# Patient Record
Sex: FEMALE | Race: WHITE | NOT HISPANIC OR LATINO | Employment: FULL TIME | URBAN - METROPOLITAN AREA
[De-identification: names, ages, dates, MRNs, and addresses within clinical notes are randomized per-mention and may not be internally consistent; named-entity substitution may affect disease eponyms.]

---

## 2024-07-18 ENCOUNTER — OFFICE VISIT (OUTPATIENT)
Dept: URGENT CARE | Facility: URGENT CARE | Age: 54
End: 2024-07-18
Payer: COMMERCIAL

## 2024-07-18 VITALS
DIASTOLIC BLOOD PRESSURE: 77 MMHG | TEMPERATURE: 99.6 F | WEIGHT: 275.6 LBS | RESPIRATION RATE: 28 BRPM | HEART RATE: 81 BPM | OXYGEN SATURATION: 100 % | SYSTOLIC BLOOD PRESSURE: 139 MMHG

## 2024-07-18 DIAGNOSIS — T14.8XXA WOUND INFECTION: Primary | ICD-10-CM

## 2024-07-18 DIAGNOSIS — L08.9 WOUND INFECTION: Primary | ICD-10-CM

## 2024-07-18 PROBLEM — F32.A ANXIETY AND DEPRESSION: Status: ACTIVE | Noted: 2023-06-27

## 2024-07-18 PROBLEM — Z82.69 FAMILY HISTORY OF ANKYLOSING SPONDYLITIS: Status: ACTIVE | Noted: 2024-01-31

## 2024-07-18 PROBLEM — R25.1 TREMOR: Status: ACTIVE | Noted: 2024-03-11

## 2024-07-18 PROBLEM — Z83.49 FAMILY HISTORY OF MTHFR DEFICIENCY: Status: ACTIVE | Noted: 2024-01-31

## 2024-07-18 PROBLEM — F41.9 ANXIETY AND DEPRESSION: Status: ACTIVE | Noted: 2023-06-27

## 2024-07-18 PROBLEM — S82.899A CLOSED FRACTURE OF ANKLE: Status: ACTIVE | Noted: 2024-07-18

## 2024-07-18 PROBLEM — Z63.0 PARTNER RELATIONSHIP PROBLEM: Status: ACTIVE | Noted: 2024-07-18

## 2024-07-18 PROBLEM — M54.50 LOW BACK PAIN: Status: ACTIVE | Noted: 2024-07-18

## 2024-07-18 PROBLEM — G47.33 OBSTRUCTIVE SLEEP APNEA OF ADULT: Status: ACTIVE | Noted: 2024-03-11

## 2024-07-18 PROBLEM — H93.13 TINNITUS, BILATERAL: Status: ACTIVE | Noted: 2024-07-18

## 2024-07-18 PROBLEM — M54.50 LOW BACK PAIN, UNSPECIFIED: Status: ACTIVE | Noted: 2024-07-18

## 2024-07-18 PROBLEM — E66.01 MORBID OBESITY (H): Status: ACTIVE | Noted: 2024-07-18

## 2024-07-18 PROBLEM — F32.9 MAJOR DEPRESSIVE DISORDER, SINGLE EPISODE, UNSPECIFIED: Status: ACTIVE | Noted: 2024-07-18

## 2024-07-18 PROBLEM — S02.2XXA CLOSED FRACTURE OF NASAL BONES: Status: ACTIVE | Noted: 2024-07-18

## 2024-07-18 PROBLEM — R21 SKIN RASH: Status: ACTIVE | Noted: 2024-07-18

## 2024-07-18 PROCEDURE — 99203 OFFICE O/P NEW LOW 30 MIN: CPT

## 2024-07-18 RX ORDER — SERTRALINE HYDROCHLORIDE 100 MG/1
100 TABLET, FILM COATED ORAL
COMMUNITY
Start: 2023-08-09

## 2024-07-18 RX ORDER — CEPHALEXIN 500 MG/1
500 CAPSULE ORAL 3 TIMES DAILY
Qty: 21 CAPSULE | Refills: 0 | Status: SHIPPED | OUTPATIENT
Start: 2024-07-18 | End: 2024-07-25

## 2024-07-18 RX ORDER — TOPIRAMATE 25 MG/1
75 TABLET, FILM COATED ORAL
COMMUNITY
Start: 2024-06-19

## 2024-07-18 RX ORDER — FUROSEMIDE 40 MG
40 TABLET ORAL 2 TIMES DAILY
COMMUNITY

## 2024-07-18 RX ORDER — PRIMIDONE 50 MG/1
150 TABLET ORAL
COMMUNITY
Start: 2024-01-15

## 2024-07-18 RX ORDER — TOPIRAMATE 100 MG/1
100 TABLET, FILM COATED ORAL
COMMUNITY
Start: 2023-03-07

## 2024-07-18 RX ORDER — ARIPIPRAZOLE 10 MG/1
10 TABLET ORAL
COMMUNITY
Start: 2023-03-07

## 2024-07-18 RX ORDER — ALBUTEROL SULFATE 90 UG/1
1-2 AEROSOL, METERED RESPIRATORY (INHALATION)
COMMUNITY
Start: 2024-02-21

## 2024-07-18 RX ORDER — PROPRANOLOL HYDROCHLORIDE 40 MG/1
TABLET ORAL
COMMUNITY
Start: 2024-05-18

## 2024-07-19 NOTE — PATIENT INSTRUCTIONS
Take the antibiotic as prescribed and finish the full course even if symptoms improve.  Try yogurt with active cultures or probiotics such as Culturelle daily to help prevent diarrhea while using antibiotics.  Bacitracin and Band-aid to the areas for dressing.  Go to the emergency department with any increase redness, swelling, drainage, bleeding, pain and/or fever/chills.

## 2024-07-19 NOTE — PROGRESS NOTES
"Assessment & Plan   (T14.8XXA,  L08.9) Wound infection  (primary encounter diagnosis)  Plan: cephALEXin (KEFLEX) 500 MG capsule    Informed the patient to take the antibiotic as prescribed and finish the full course even if symptoms improve.  We discussed trying yogurt with active cultures or probiotic such as Culturelle daily to help find diarrhea will take the antibiotic.  We also discussed using bacitracin and Band-Aid to the areas for dressing.  Informed the patient to go to the emergency department with any increase in redness, swelling, drainage, bleeding, pain and or fever/chills.  Patient acknowledged her understanding of the above plan.    The use of Dragon/[employer] dictation services may have been used to construct the content in this note; any grammatical or spelling errors are non-intentional. Please contact the author of this note directly if you are in need of any clarification.      KIKO Olmstead CNP  [facility] is a 53 year old female who presents to clinic today for the following health issues:  Chief Complaint   Patient presents with    Urgent Care    Wound Check     Have 2 open sores in left wrist and hand and turn into cellulitis, got bite by something, scratch it and now infected, need antibiotic      HPI  The patient reports having insect bites on her left wrist and hand approximately 4 days ago.  She states that she is \"an itcher\" and indicates she has been scratching the insect bites.  These two areas have become progressively more uncomfortable, swollen and red over the past 2 days.  She has been utilizing Band-Aids for treatment.    ROS:  Negative except noted above.    Review of Systems        Objective    /77 (BP Location: Left arm, Patient Position: Sitting, Cuff Size: Adult Large)   Pulse 81   Temp 99.6  F (37.6  C) (Oral)   Resp 28   Wt 125 kg (275 lb 9.6 oz)   SpO2 100%   Physical Exam   GENERAL: " alert and no distress  SKIN: 2 distinct wounds visualized.  The first wound is approximately 2 cm in diameter located on the left wrist.  The wound is ulcerated with mild bleeding and surrounding erythema.  No drainage present.    The second wound is located on the webbing of the left thumb and measures approximately 1 cm in diameter.  The wound is ulcerated without bleeding, surrounding erythema or drainage.